# Patient Record
Sex: FEMALE | Race: WHITE | NOT HISPANIC OR LATINO | URBAN - METROPOLITAN AREA
[De-identification: names, ages, dates, MRNs, and addresses within clinical notes are randomized per-mention and may not be internally consistent; named-entity substitution may affect disease eponyms.]

---

## 2019-03-26 ENCOUNTER — OUTPATIENT (OUTPATIENT)
Dept: OUTPATIENT SERVICES | Facility: HOSPITAL | Age: 64
LOS: 1 days | End: 2019-03-26
Payer: MEDICARE

## 2019-03-26 DIAGNOSIS — R42 DIZZINESS AND GIDDINESS: ICD-10-CM

## 2019-03-26 DIAGNOSIS — I95.1 ORTHOSTATIC HYPOTENSION: ICD-10-CM

## 2019-03-26 PROCEDURE — 93010 ELECTROCARDIOGRAM REPORT: CPT

## 2019-03-26 PROCEDURE — 93306 TTE W/DOPPLER COMPLETE: CPT | Mod: 26

## 2019-03-26 PROCEDURE — 93227 XTRNL ECG REC<48 HR R&I: CPT

## 2019-03-28 PROCEDURE — 93225 XTRNL ECG REC<48 HRS REC: CPT

## 2019-03-28 PROCEDURE — 93306 TTE W/DOPPLER COMPLETE: CPT

## 2019-03-28 PROCEDURE — 93005 ELECTROCARDIOGRAM TRACING: CPT

## 2019-10-17 ENCOUNTER — APPOINTMENT (OUTPATIENT)
Dept: ORTHOPEDIC SURGERY | Facility: CLINIC | Age: 64
End: 2019-10-17
Payer: MEDICARE

## 2019-10-17 VITALS — BODY MASS INDEX: 27.32 KG/M2 | HEIGHT: 66 IN | WEIGHT: 170 LBS

## 2019-10-17 PROBLEM — Z00.00 ENCOUNTER FOR PREVENTIVE HEALTH EXAMINATION: Status: ACTIVE | Noted: 2019-10-17

## 2019-10-17 PROCEDURE — 99204 OFFICE O/P NEW MOD 45 MIN: CPT

## 2019-10-17 NOTE — DISCUSSION/SUMMARY
[de-identified] : I recommend a heel lift on the left. She will continue with stretching. I don't think arthroscopic surgery will help her right knee given the lack of joint line pain and negative Sahil. Some of her symptoms are coming from arthritis and lack of extension. Followup will be as needed

## 2019-10-17 NOTE — HISTORY OF PRESENT ILLNESS
[de-identified] : Location: right knee\par Quality: sharp\par Duration:3/2019\par Context: Atraumatic\par Aggravating Factors: walking,bending  \par Conservative treatment: PT ( Last session 10/07/*2019)\par Associated Symptoms: Swelling, stiffness, weakness\par Prior Studies:MRI 03/13/2019 \par She has had prior stem cells performed in her left hip as well as right knee in [place]. She has a known leg length discrepancy with the left shorter than the right. She does have a spinal problem which is known about.\par

## 2019-10-17 NOTE — PHYSICAL EXAM
[de-identified] : MRI report of the right knee is in the chart from 3/13/[id number] [place] [facility] [de-identified] : Right knee shows no warmth or swelling. She does lack about 5 or 10° of extension. The left leg is shorter than the right by half an inch. This is without extending her right knee fully. There is a negative Sahil test there is no joint line pain there is some tenderness in the popliteal area neurovascular exam is normal negative Homans sign

## 2020-11-02 ENCOUNTER — APPOINTMENT (OUTPATIENT)
Dept: ORTHOPEDIC SURGERY | Facility: CLINIC | Age: 65
End: 2020-11-02
Payer: MEDICARE

## 2020-11-02 VITALS — HEIGHT: 66 IN | BODY MASS INDEX: 27.32 KG/M2 | WEIGHT: 170 LBS

## 2020-11-02 DIAGNOSIS — M17.11 UNILATERAL PRIMARY OSTEOARTHRITIS, RIGHT KNEE: ICD-10-CM

## 2020-11-02 DIAGNOSIS — M16.12 UNILATERAL PRIMARY OSTEOARTHRITIS, LEFT HIP: ICD-10-CM

## 2020-11-02 DIAGNOSIS — M25.552 PAIN IN LEFT HIP: ICD-10-CM

## 2020-11-02 PROCEDURE — 99214 OFFICE O/P EST MOD 30 MIN: CPT

## 2020-11-02 PROCEDURE — 72170 X-RAY EXAM OF PELVIS: CPT

## 2020-11-02 PROCEDURE — 73562 X-RAY EXAM OF KNEE 3: CPT | Mod: RT

## 2020-11-02 NOTE — PHYSICAL EXAM
[Slightly Antalgic] : slightly antalgic [LE] : Sensory: Intact in bilateral lower extremities [Normal RLE] : Right Lower Extremity: No scars, rashes, lesions, ulcers, skin intact [Normal LLE] : Left Lower Extremity: No scars, rashes, lesions, ulcers, skin intact [Normal Touch] : sensation intact for touch [Normal] : No swelling, no edema, normal pedal pulses and normal temperature [de-identified] : Right knee shows no obvious warmth or some swelling. She lacks about 10° of extension. She has crepitus on range of motion which flexes to 110°. There is tenderness posteriorly. Negative Homans sign. No real pain along the joint line. Neurovascular exam is normal.\par \par Left hip shows zero internal rotation with pain. External rotation is 45° without pain. Flexion is to 90 without pain. Neurovascular exam is normal she does have a leg length discrepancy. [de-identified] : AP pelvis and lateral left hip shows severe degenerative arthritis of the left hip with bone-on-bone contact\par \par Right knee shows some preserved joint space with evidence of some arthritis and bone spurs.

## 2020-11-02 NOTE — DISCUSSION/SUMMARY
[de-identified] : This patient is contemplating having stem cells again done in her left hip and right knee. She wants to have an MRI done of her right knee. We'll make arrangements we'll call her with the results. She does not total hip replacement or total knee replacement followup will be per telephone.

## 2020-11-02 NOTE — HISTORY OF PRESENT ILLNESS
[Pain Location] : pain [Worsening] : worsening [de-identified] : She complains of some right knee pain and stiffness as well as some left hip pain. She has done stem cells in the past and is probably going to repeat them. Pain is probably getting worse slowly over time.

## 2022-01-27 ENCOUNTER — RESULT REVIEW (OUTPATIENT)
Age: 67
End: 2022-01-27

## 2023-05-26 ENCOUNTER — NON-APPOINTMENT (OUTPATIENT)
Age: 68
End: 2023-05-26

## 2023-06-09 ENCOUNTER — APPOINTMENT (OUTPATIENT)
Dept: OBGYN | Facility: CLINIC | Age: 68
End: 2023-06-09
Payer: MEDICARE

## 2023-06-09 VITALS
DIASTOLIC BLOOD PRESSURE: 85 MMHG | SYSTOLIC BLOOD PRESSURE: 143 MMHG | HEART RATE: 69 BPM | OXYGEN SATURATION: 97 % | BODY MASS INDEX: 26.95 KG/M2 | WEIGHT: 167 LBS

## 2023-06-09 DIAGNOSIS — H40.9 UNSPECIFIED GLAUCOMA: ICD-10-CM

## 2023-06-09 DIAGNOSIS — Z87.39 PERSONAL HISTORY OF OTHER DISEASES OF THE MUSCULOSKELETAL SYSTEM AND CONNECTIVE TISSUE: ICD-10-CM

## 2023-06-09 DIAGNOSIS — N95.1 MENOPAUSAL AND FEMALE CLIMACTERIC STATES: ICD-10-CM

## 2023-06-09 DIAGNOSIS — L72.3 SEBACEOUS CYST: ICD-10-CM

## 2023-06-09 DIAGNOSIS — N76.2 ACUTE VULVITIS: ICD-10-CM

## 2023-06-09 PROCEDURE — 99203 OFFICE O/P NEW LOW 30 MIN: CPT

## 2023-06-09 NOTE — PHYSICAL EXAM
[Chaperone Present] : A chaperone was present in the examining room during all aspects of the physical examination [Appropriately responsive] : appropriately responsive [Alert] : alert [No Acute Distress] : no acute distress [Soft] : soft [Non-tender] : non-tender [Non-distended] : non-distended [No Mass] : no mass [Oriented x3] : oriented x3 [Labia Majora] : normal [Labia Minora] : normal [Bartholin's Gland] : both Bartholin's glands were normal  [Normal] : normal [Atrophy] : atrophy [FreeTextEntry2] : area that had been enlarged and now drained was NOT in bartholins location as she was told, rather mid position of right labia majora, well healed now, very small area of healing but no collection/ no mass

## 2023-06-09 NOTE — PLAN
[FreeTextEntry1] : new patient problem visith\par Over 50% of 30 min  visit history taking and counseling and coordination of care.\par \par *pt was told had bartholins abscess by urgent care but area of concern NOT bartholins\par rather vulva, took 10 days bactrim, soaked and she feels it "dranied"\par no erythema, no induration,no collection: appears well\par \par told to f/u with me to check healing and to also do "annuaL'\par

## 2023-06-09 NOTE — HISTORY OF PRESENT ILLNESS
[Currently Active] : currently active [Men] : men [Yes] : pregnancy [FreeTextEntry1] : Elaine Fabio presents for bartholins f/u: problem visit\par was seen in urgent care end May\par this seems to have drained on its own\par eczema/ rosacea on face

## 2024-12-30 ENCOUNTER — APPOINTMENT (OUTPATIENT)
Dept: ORTHOPEDIC SURGERY | Facility: CLINIC | Age: 69
End: 2024-12-30
Payer: MEDICARE

## 2024-12-30 VITALS — WEIGHT: 160 LBS | HEIGHT: 66 IN | BODY MASS INDEX: 25.71 KG/M2

## 2024-12-30 DIAGNOSIS — M67.431 GANGLION, RIGHT WRIST: ICD-10-CM

## 2024-12-30 DIAGNOSIS — S42.291S OTHER DISPLACED FRACTURE OF UPPER END OF RIGHT HUMERUS, SEQUELA: ICD-10-CM

## 2024-12-30 DIAGNOSIS — M67.432 GANGLION, LEFT WRIST: ICD-10-CM

## 2024-12-30 DIAGNOSIS — M18.9 OSTEOARTHRITIS OF FIRST CARPOMETACARPAL JOINT, UNSPECIFIED: ICD-10-CM

## 2024-12-30 PROCEDURE — 99204 OFFICE O/P NEW MOD 45 MIN: CPT

## 2024-12-30 PROCEDURE — 73110 X-RAY EXAM OF WRIST: CPT | Mod: LT

## 2024-12-30 RX ORDER — MELOXICAM 15 MG/1
15 TABLET ORAL
Qty: 30 | Refills: 0 | Status: ACTIVE | COMMUNITY
Start: 2024-12-30 | End: 1900-01-01

## 2025-01-02 PROBLEM — M67.432 GANGLION OF LEFT WRIST: Status: ACTIVE | Noted: 2025-01-02

## 2025-01-02 PROBLEM — M67.431 GANGLION OF RIGHT WRIST: Status: RESOLVED | Noted: 2024-12-30 | Resolved: 2025-01-02

## 2025-01-30 ENCOUNTER — RX RENEWAL (OUTPATIENT)
Age: 70
End: 2025-01-30

## 2025-05-19 ENCOUNTER — APPOINTMENT (OUTPATIENT)
Dept: ORTHOPEDIC SURGERY | Facility: CLINIC | Age: 70
End: 2025-05-19
Payer: MEDICARE

## 2025-05-19 DIAGNOSIS — R20.2 PARESTHESIA OF SKIN: ICD-10-CM

## 2025-05-19 PROCEDURE — 99214 OFFICE O/P EST MOD 30 MIN: CPT

## 2025-06-02 ENCOUNTER — APPOINTMENT (OUTPATIENT)
Dept: ORTHOPEDIC SURGERY | Facility: CLINIC | Age: 70
End: 2025-06-02
Payer: MEDICARE

## 2025-06-02 VITALS — WEIGHT: 169 LBS | HEIGHT: 66 IN | BODY MASS INDEX: 27.16 KG/M2

## 2025-06-02 PROCEDURE — 73140 X-RAY EXAM OF FINGER(S): CPT | Mod: RT

## 2025-06-02 PROCEDURE — 99214 OFFICE O/P EST MOD 30 MIN: CPT

## 2025-06-02 RX ORDER — AMLODIPINE BESYLATE 5 MG/1
5 TABLET ORAL
Refills: 0 | Status: ACTIVE | COMMUNITY

## 2025-06-03 PROBLEM — S68.119A FINGERTIP AMPUTATION: Status: ACTIVE | Noted: 2025-06-03

## 2025-06-06 ENCOUNTER — APPOINTMENT (OUTPATIENT)
Facility: CLINIC | Age: 70
End: 2025-06-06
Payer: MEDICARE

## 2025-06-06 PROCEDURE — 95912 NRV CNDJ TEST 11-12 STUDIES: CPT

## 2025-06-06 PROCEDURE — 95886 MUSC TEST DONE W/N TEST COMP: CPT

## 2025-06-09 ENCOUNTER — APPOINTMENT (OUTPATIENT)
Dept: ORTHOPEDIC SURGERY | Facility: CLINIC | Age: 70
End: 2025-06-09
Payer: MEDICARE

## 2025-06-09 PROCEDURE — 99214 OFFICE O/P EST MOD 30 MIN: CPT

## 2025-06-10 PROBLEM — G56.01 CARPAL TUNNEL SYNDROME OF RIGHT WRIST: Status: ACTIVE | Noted: 2025-06-10

## 2025-06-10 PROBLEM — R20.2 PARESTHESIAS IN LEFT HAND: Status: RESOLVED | Noted: 2025-05-19 | Resolved: 2025-06-10

## 2025-06-10 PROBLEM — R20.2 PARESTHESIAS IN RIGHT HAND: Status: RESOLVED | Noted: 2025-05-19 | Resolved: 2025-06-10

## 2025-06-23 ENCOUNTER — APPOINTMENT (OUTPATIENT)
Dept: ORTHOPEDIC SURGERY | Facility: CLINIC | Age: 70
End: 2025-06-23

## 2025-06-23 PROCEDURE — 99213 OFFICE O/P EST LOW 20 MIN: CPT

## 2025-07-18 ENCOUNTER — APPOINTMENT (OUTPATIENT)
Dept: ORTHOPEDIC SURGERY | Facility: CLINIC | Age: 70
End: 2025-07-18
Payer: MEDICARE

## 2025-07-18 PROCEDURE — 99213 OFFICE O/P EST LOW 20 MIN: CPT

## 2025-08-16 ENCOUNTER — NON-APPOINTMENT (OUTPATIENT)
Age: 70
End: 2025-08-16

## 2025-08-18 ENCOUNTER — APPOINTMENT (OUTPATIENT)
Dept: ORTHOPEDIC SURGERY | Facility: CLINIC | Age: 70
End: 2025-08-18
Payer: MEDICARE

## 2025-08-18 DIAGNOSIS — G56.02 CARPAL TUNNEL SYNDROME, LEFT UPPER LIMB: ICD-10-CM

## 2025-08-18 DIAGNOSIS — S68.119A COMPLETE TRAUMATIC METACARPOPHALANGEAL AMPUTATION OF UNSPECIFIED FINGER, INITIAL ENCOUNTER: ICD-10-CM

## 2025-08-18 DIAGNOSIS — M67.432 GANGLION, LEFT WRIST: ICD-10-CM

## 2025-08-18 DIAGNOSIS — M18.9 OSTEOARTHRITIS OF FIRST CARPOMETACARPAL JOINT, UNSPECIFIED: ICD-10-CM

## 2025-08-18 PROCEDURE — 99213 OFFICE O/P EST LOW 20 MIN: CPT
